# Patient Record
Sex: MALE | Race: WHITE | NOT HISPANIC OR LATINO | Employment: OTHER | URBAN - METROPOLITAN AREA
[De-identification: names, ages, dates, MRNs, and addresses within clinical notes are randomized per-mention and may not be internally consistent; named-entity substitution may affect disease eponyms.]

---

## 2025-02-04 ENCOUNTER — HOSPITAL ENCOUNTER (OUTPATIENT)
Dept: RADIOLOGY | Facility: HOSPITAL | Age: 85
Discharge: HOME | End: 2025-02-04
Payer: MEDICARE

## 2025-02-04 DIAGNOSIS — R10.13 EPIGASTRIC PAIN: ICD-10-CM

## 2025-02-04 PROCEDURE — 76705 ECHO EXAM OF ABDOMEN: CPT

## 2025-02-04 PROCEDURE — 76705 ECHO EXAM OF ABDOMEN: CPT | Performed by: RADIOLOGY

## 2025-02-06 ENCOUNTER — TELEPHONE (OUTPATIENT)
Dept: OPHTHALMOLOGY | Facility: CLINIC | Age: 85
End: 2025-02-06
Payer: MEDICARE

## 2025-02-06 ENCOUNTER — OFFICE VISIT (OUTPATIENT)
Dept: OPHTHALMOLOGY | Facility: CLINIC | Age: 85
End: 2025-02-06
Payer: MEDICARE

## 2025-02-06 DIAGNOSIS — H02.889 MGD (MEIBOMIAN GLAND DYSFUNCTION): ICD-10-CM

## 2025-02-06 DIAGNOSIS — H01.02A SQUAMOUS BLEPHARITIS OF BOTH UPPER AND LOWER EYELID OF RIGHT EYE: Primary | ICD-10-CM

## 2025-02-06 PROBLEM — H25.13 AGE-RELATED NUCLEAR CATARACT OF BOTH EYES: Status: ACTIVE | Noted: 2025-02-06

## 2025-02-06 PROCEDURE — 99213 OFFICE O/P EST LOW 20 MIN: CPT | Performed by: OPHTHALMOLOGY

## 2025-02-06 RX ORDER — NEOMYCIN SULFATE, POLYMYXIN B SULFATE AND DEXAMETHASONE 3.5; 10000; 1 MG/ML; [USP'U]/ML; MG/ML
1 SUSPENSION/ DROPS OPHTHALMIC 3 TIMES DAILY
Qty: 5 ML | Refills: 0 | Status: SHIPPED | OUTPATIENT
Start: 2025-02-06

## 2025-02-06 ASSESSMENT — ENCOUNTER SYMPTOMS
HEMATOLOGIC/LYMPHATIC NEGATIVE: 0
NEUROLOGICAL NEGATIVE: 0
EYES NEGATIVE: 0
CONSTITUTIONAL NEGATIVE: 0
MUSCULOSKELETAL NEGATIVE: 0
ENDOCRINE NEGATIVE: 0
CARDIOVASCULAR NEGATIVE: 0
GASTROINTESTINAL NEGATIVE: 0
PSYCHIATRIC NEGATIVE: 0
ALLERGIC/IMMUNOLOGIC NEGATIVE: 0
RESPIRATORY NEGATIVE: 0

## 2025-02-06 ASSESSMENT — EXTERNAL EXAM - RIGHT EYE: OD_EXAM: NORMAL

## 2025-02-06 ASSESSMENT — VISUAL ACUITY
OD_PH_SC: 20/20
OD_SC+: -1
OD_SC: 20/40
OS_SC+: -2
OS_SC: 20/25
METHOD: SNELLEN - LINEAR

## 2025-02-06 ASSESSMENT — PATIENT HEALTH QUESTIONNAIRE - PHQ9
SUM OF ALL RESPONSES TO PHQ9 QUESTIONS 1 AND 2: 0
2. FEELING DOWN, DEPRESSED OR HOPELESS: NOT AT ALL
1. LITTLE INTEREST OR PLEASURE IN DOING THINGS: NOT AT ALL

## 2025-02-06 ASSESSMENT — SLIT LAMP EXAM - LIDS: COMMENTS: MEIBOMIAN GLAND DYSFUNCTION

## 2025-02-06 ASSESSMENT — PAIN SCALES - GENERAL: PAINLEVEL_OUTOF10: 0-NO PAIN

## 2025-02-06 ASSESSMENT — EXTERNAL EXAM - LEFT EYE: OS_EXAM: NORMAL

## 2025-02-06 NOTE — PATIENT INSTRUCTIONS
Thank you so much for choosing me to provide your care today!    If you were dilated your vision may remain blurry   or light sensitive for several hours.    The nature of eye and vision problems can require frequent follow up, please make every effort to adhere to any future appointments.    If you have any issues, questions, or concerns,   please do not hesitate to reach out.    If you receive a survey in regards to your care today, please mention any exceptional care my office staff and/or technicians provided.    You can reach our office at this number:    [phone]    Please consider signing up for and utilizing [employer]!  This is the best way to directly reach me or other  providers

## 2025-02-06 NOTE — ASSESSMENT & PLAN NOTE
Likely root cause of blepharitis presentation. Will work on lid hygiene and control once acute blepharitis better controlled.

## 2025-02-06 NOTE — ASSESSMENT & PLAN NOTE
Blepharitis vs. Early hordeola. Will have start course of maxitrol but wait on warm compress to avoid increasing redness and swelling in short term. Can taper slightly on drops if improving. See back in few weeks for follow up.

## 2025-02-06 NOTE — TELEPHONE ENCOUNTER
Pt was last seen in 2021     Pts right eye swollen, something in eye.  Issue going on for 2 days  [phone]

## 2025-02-06 NOTE — PROGRESS NOTES
Assessment/Plan   Problem List Items Addressed This Visit       Squamous blepharitis of both upper and lower eyelid of right eye - Primary     Blepharitis vs. Early hordeola. Will have start course of maxitrol but wait on warm compress to avoid increasing redness and swelling in short term. Can taper slightly on drops if improving. See back in few weeks for follow up.          Relevant Medications    neomycin-polymyxin-dexAMETHasone (Maxitrol) 3.5mg/mL-10,000 unit/mL-0.1 % ophthalmic suspension    MGD (meibomian gland dysfunction)     Likely root cause of blepharitis presentation. Will work on lid hygiene and control once acute blepharitis better controlled.             Provided reassurance regarding above diagnoses and care received in the office visit today. Discussed outcomes and options along with the importance of treatment compliance. Understands the importance of any follow up visits. Patient instructed to call/communicate with our office if any new issues, questions, or concerns.     Will plan to see back in 1-2 weeks short check or sooner PRN

## 2025-02-21 ENCOUNTER — TELEPHONE (OUTPATIENT)
Dept: OPHTHALMOLOGY | Facility: CLINIC | Age: 85
End: 2025-02-21
Payer: MEDICARE

## 2025-02-21 DIAGNOSIS — H01.02A SQUAMOUS BLEPHARITIS OF BOTH UPPER AND LOWER EYELID OF RIGHT EYE: ICD-10-CM

## 2025-02-21 RX ORDER — NEOMYCIN SULFATE, POLYMYXIN B SULFATE AND DEXAMETHASONE 3.5; 10000; 1 MG/ML; [USP'U]/ML; MG/ML
1 SUSPENSION/ DROPS OPHTHALMIC 3 TIMES DAILY
Qty: 5 ML | Refills: 0 | Status: SHIPPED | OUTPATIENT
Start: 2025-02-21 | End: 2025-02-24

## 2025-02-21 NOTE — TELEPHONE ENCOUNTER
Pt is completely out of eye drops.  Has appointment on Monday.    Neomycin-polymyxin dexamethasone   Drug [facility]     Pt phone number [phone]

## 2025-02-24 ENCOUNTER — APPOINTMENT (OUTPATIENT)
Dept: OPHTHALMOLOGY | Facility: CLINIC | Age: 85
End: 2025-02-24
Payer: MEDICARE

## 2025-02-24 DIAGNOSIS — H02.889 MGD (MEIBOMIAN GLAND DYSFUNCTION): ICD-10-CM

## 2025-02-24 DIAGNOSIS — H00.11 CHALAZION OF RIGHT UPPER EYELID: ICD-10-CM

## 2025-02-24 DIAGNOSIS — H01.02A SQUAMOUS BLEPHARITIS OF BOTH UPPER AND LOWER EYELID OF RIGHT EYE: Primary | ICD-10-CM

## 2025-02-24 PROCEDURE — 99212 OFFICE O/P EST SF 10 MIN: CPT | Performed by: OPHTHALMOLOGY

## 2025-02-24 RX ORDER — OMEPRAZOLE 20 MG/1
1 CAPSULE, DELAYED RELEASE ORAL DAILY
COMMUNITY
Start: 2025-02-03 | End: 2025-03-05

## 2025-02-24 RX ORDER — TADALAFIL 20 MG/1
20 TABLET ORAL DAILY PRN
COMMUNITY

## 2025-02-24 RX ORDER — NEOMYCIN SULFATE, POLYMYXIN B SULFATE AND DEXAMETHASONE 3.5; 10000; 1 MG/ML; [USP'U]/ML; MG/ML
SUSPENSION/ DROPS OPHTHALMIC
Start: 2025-02-24

## 2025-02-24 RX ORDER — FINASTERIDE 1 MG/1
1 TABLET, FILM COATED ORAL DAILY
COMMUNITY
Start: 2025-02-03 | End: 2025-03-05

## 2025-02-24 RX ORDER — DICLOFENAC SODIUM 75 MG/1
75 TABLET, DELAYED RELEASE ORAL
COMMUNITY
Start: 2025-02-10

## 2025-02-24 RX ORDER — MELATONIN 3 MG
TABLET ORAL EVERY 24 HOURS
COMMUNITY

## 2025-02-24 RX ORDER — TAMSULOSIN HYDROCHLORIDE 0.4 MG/1
0.4 CAPSULE ORAL 2 TIMES DAILY
COMMUNITY
Start: 2024-05-15

## 2025-02-24 ASSESSMENT — PAIN SCALES - GENERAL: PAINLEVEL_OUTOF10: 0-NO PAIN

## 2025-02-24 ASSESSMENT — VISUAL ACUITY
OD_SC: 20/25
OD_SC+: +2
OS_SC: 20/20
METHOD: SNELLEN - SINGLE

## 2025-02-24 ASSESSMENT — PATIENT HEALTH QUESTIONNAIRE - PHQ9
2. FEELING DOWN, DEPRESSED OR HOPELESS: NOT AT ALL
1. LITTLE INTEREST OR PLEASURE IN DOING THINGS: NOT AT ALL
SUM OF ALL RESPONSES TO PHQ9 QUESTIONS 1 AND 2: 0

## 2025-02-24 ASSESSMENT — ENCOUNTER SYMPTOMS
RESPIRATORY NEGATIVE: 0
CONSTITUTIONAL NEGATIVE: 0
GASTROINTESTINAL NEGATIVE: 0
HEMATOLOGIC/LYMPHATIC NEGATIVE: 0
ENDOCRINE NEGATIVE: 0
EYES NEGATIVE: 0
CARDIOVASCULAR NEGATIVE: 0
ALLERGIC/IMMUNOLOGIC NEGATIVE: 0
MUSCULOSKELETAL NEGATIVE: 0
PSYCHIATRIC NEGATIVE: 0
NEUROLOGICAL NEGATIVE: 0

## 2025-02-24 ASSESSMENT — EXTERNAL EXAM - RIGHT EYE: OD_EXAM: NORMAL

## 2025-02-24 ASSESSMENT — TONOMETRY
IOP_METHOD: GOLDMANN APPLANATION
OD_IOP_MMHG: 15

## 2025-02-24 ASSESSMENT — SLIT LAMP EXAM - LIDS: COMMENTS: MEIBOMIAN GLAND DYSFUNCTION

## 2025-02-24 ASSESSMENT — EXTERNAL EXAM - LEFT EYE: OS_EXAM: NORMAL

## 2025-02-24 NOTE — PATIENT INSTRUCTIONS
Thank you so much for choosing me to provide your care today!    If you were dilated your vision may remain blurry   or light sensitive for several hours.    The nature of eye and vision problems can require frequent follow up, please make every effort to adhere to any future appointments.    If you have any issues, questions, or concerns,   please do not hesitate to reach out.    If you receive a survey in regards to your care today, please mention any exceptional care my office staff and/or technicians provided.    You can reach our office at this number:    [phone]    Please consider signing up for and utilizing [employer]!  This is the best way to directly reach me or other  providers    Warm Compress for Eyes    [x]Warm compress 1 time(s) daily to both eye(s)  10-15 minutes on closed eyes  To make a reusable compress:  Place 1 cup dry uncooked rice in a clean sock  Tie a knot  Microwave for 10-20 seconds, may add more time as needed  Compress should be warm but not hot   Replace if soiled or develops bad odor  May follow compress with gentle eyelid massage

## 2025-02-24 NOTE — ASSESSMENT & PLAN NOTE
Slight improvement in clinical exam, with nodule in lid seems likely chalazion contributed more to presentation at onset. Discussed continued use of maxitrol along with adding in warm compress.

## 2025-02-24 NOTE — PROGRESS NOTES
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 Discussed continued use of maxitrol along with adding in warm compress.          Relevant Medications    neomycin-polymyxin-dexAMETHasone (Maxitrol) 3.5mg/mL-10,000 unit/mL-0.1 % ophthalmic suspension    MGD (meibomian gland dysfunction)     Advised on role of regular warm compress both eyes (OU) to help with lid gland dysfunction.          Chalazion of right upper eyelid     Seems better explanation at this time for current symptoms. Cannot rule out role of blepharitis. Advised to continue maxitrol and on warm compress. See back in few weeks.             Provided reassurance regarding above diagnoses and care received in the office visit today. Discussed outcomes and options along with the importance of treatment compliance. Understands the importance of any follow up visits. Patient instructed to call/communicate with our office if any new issues, questions, or concerns.     Will plan to see back in few weeks for short check or sooner PRN

## 2025-02-24 NOTE — ASSESSMENT & PLAN NOTE
Seems better explanation at this time for current symptoms. Cannot rule out role of blepharitis. Advised to continue maxitrol and on warm compress. See back in few weeks.

## 2025-03-03 ENCOUNTER — TELEPHONE (OUTPATIENT)
Dept: OPHTHALMOLOGY | Facility: CLINIC | Age: 85
End: 2025-03-03
Payer: MEDICARE

## 2025-03-03 NOTE — TELEPHONE ENCOUNTER
Pt is asking that you call him when available, he is aware that you are not in office today.   [phone].

## 2025-03-03 NOTE — TELEPHONE ENCOUNTER
Pt called to say that his problem is now in both eyes, both swollen. He wants to know if he should still continue his eye drops twice daily?

## 2025-03-04 DIAGNOSIS — H10.213 ACUTE CHEMICAL CONJUNCTIVITIS OF BOTH EYES: Primary | ICD-10-CM

## 2025-03-04 RX ORDER — PREDNISOLONE ACETATE 10 MG/ML
1 SUSPENSION/ DROPS OPHTHALMIC 4 TIMES DAILY
Qty: 5 ML | Refills: 0 | Status: SHIPPED | OUTPATIENT
Start: 2025-03-04 | End: 2025-03-13 | Stop reason: SDUPTHER

## 2025-03-13 DIAGNOSIS — H10.213 ACUTE CHEMICAL CONJUNCTIVITIS OF BOTH EYES: ICD-10-CM

## 2025-03-13 RX ORDER — PREDNISOLONE ACETATE 10 MG/ML
1 SUSPENSION/ DROPS OPHTHALMIC 4 TIMES DAILY
Qty: 5 ML | Refills: 0 | Status: SHIPPED | OUTPATIENT
Start: 2025-03-13

## 2025-03-14 ENCOUNTER — TELEPHONE (OUTPATIENT)
Dept: OPHTHALMOLOGY | Facility: CLINIC | Age: 85
End: 2025-03-14
Payer: MEDICARE

## 2025-03-17 ENCOUNTER — APPOINTMENT (OUTPATIENT)
Dept: OPHTHALMOLOGY | Facility: CLINIC | Age: 85
End: 2025-03-17
Payer: MEDICARE

## 2025-03-19 ENCOUNTER — OFFICE VISIT (OUTPATIENT)
Dept: OPHTHALMOLOGY | Facility: CLINIC | Age: 85
End: 2025-03-19
Payer: MEDICARE

## 2025-03-19 DIAGNOSIS — H00.11 CHALAZION OF RIGHT UPPER EYELID: ICD-10-CM

## 2025-03-19 DIAGNOSIS — H02.889 MGD (MEIBOMIAN GLAND DYSFUNCTION): Primary | ICD-10-CM

## 2025-03-19 PROCEDURE — 99213 OFFICE O/P EST LOW 20 MIN: CPT | Performed by: OPHTHALMOLOGY

## 2025-03-19 RX ORDER — DOXYCYCLINE HYCLATE 100 MG
100 TABLET ORAL 2 TIMES DAILY
Qty: 60 TABLET | Refills: 1 | Status: SHIPPED | OUTPATIENT
Start: 2025-03-19 | End: 2025-05-18

## 2025-03-19 RX ORDER — PREDNISOLONE ACETATE 10 MG/ML
1 SUSPENSION/ DROPS OPHTHALMIC 2 TIMES DAILY
Start: 2025-03-19

## 2025-03-19 ASSESSMENT — ENCOUNTER SYMPTOMS
RESPIRATORY NEGATIVE: 0
ALLERGIC/IMMUNOLOGIC NEGATIVE: 0
HEMATOLOGIC/LYMPHATIC NEGATIVE: 0
NEUROLOGICAL NEGATIVE: 0
EYES NEGATIVE: 0
ENDOCRINE NEGATIVE: 0
CONSTITUTIONAL NEGATIVE: 0
GASTROINTESTINAL NEGATIVE: 0
CARDIOVASCULAR NEGATIVE: 0
PSYCHIATRIC NEGATIVE: 0
MUSCULOSKELETAL NEGATIVE: 0

## 2025-03-19 ASSESSMENT — VISUAL ACUITY
METHOD: SNELLEN - SINGLE
OD_SC: 20/40
OS_SC+: +2
OS_SC: 20/40

## 2025-03-19 ASSESSMENT — PAIN SCALES - GENERAL: PAINLEVEL_OUTOF10: 0-NO PAIN

## 2025-03-19 ASSESSMENT — EXTERNAL EXAM - LEFT EYE: OS_EXAM: NORMAL

## 2025-03-19 ASSESSMENT — TONOMETRY
OD_IOP_MMHG: 16
IOP_METHOD: GOLDMANN APPLANATION
OS_IOP_MMHG: 16

## 2025-03-19 ASSESSMENT — EXTERNAL EXAM - RIGHT EYE: OD_EXAM: NORMAL

## 2025-03-19 NOTE — PROGRESS NOTES
Assessment/Plan   Problem List Items Addressed This Visit       MGD (meibomian gland dysfunction) - Primary     Improvement in prior allergic reaction, suspect secondary to maxitrol. Will have continue prednisolone until seen back. Significant gland blockage and chalazia both eyes (OU) today. Will start course of doxycycline. Continue good heat in interim.          Relevant Medications    doxycycline (Vibra-Tabs) 100 mg tablet    prednisoLONE acetate (Pred-Forte) 1 % ophthalmic suspension    Chalazion of right upper eyelid     Numerous chalazia both eyes (OU), as per meibomian gland dysfunction (MGD) assessment         Relevant Medications    doxycycline (Vibra-Tabs) 100 mg tablet    prednisoLONE acetate (Pred-Forte) 1 % ophthalmic suspension       Provided reassurance regarding above diagnoses and care received in the office visit today. Discussed outcomes and options along with the importance of treatment compliance. Understands the importance of any follow up visits. Patient instructed to call/communicate with our office if any new issues, questions, or concerns.     Will plan to see back in few weeks short check or sooner PRN

## 2025-03-19 NOTE — ASSESSMENT & PLAN NOTE
Improvement in prior allergic reaction, suspect secondary to maxitrol. Will have continue prednisolone until seen back. Significant gland blockage and chalazia both eyes (OU) today. Will start course of doxycycline. Continue good heat in interim.

## 2025-03-19 NOTE — PATIENT INSTRUCTIONS
Thank you so much for choosing me to provide your care today!    If you were dilated your vision may remain blurry   or light sensitive for several hours.    The nature of eye and vision problems can require frequent follow up, please make every effort to adhere to any future appointments.    If you have any issues, questions, or concerns,   please do not hesitate to reach out.    If you receive a survey in regards to your care today, please mention any exceptional care my office staff and/or technicians provided.    You can reach our office at this number:    [phone]    Please consider signing up for and utilizing [employer]!  This is the best way to directly reach me or other  providers

## 2025-04-09 ENCOUNTER — APPOINTMENT (OUTPATIENT)
Dept: OPHTHALMOLOGY | Facility: CLINIC | Age: 85
End: 2025-04-09
Payer: MEDICARE

## 2025-04-09 DIAGNOSIS — H02.889 MGD (MEIBOMIAN GLAND DYSFUNCTION): ICD-10-CM

## 2025-04-09 DIAGNOSIS — H00.11 CHALAZION OF RIGHT UPPER EYELID: ICD-10-CM

## 2025-04-09 PROCEDURE — 99213 OFFICE O/P EST LOW 20 MIN: CPT | Performed by: OPHTHALMOLOGY

## 2025-04-09 RX ORDER — PREDNISOLONE ACETATE 10 MG/ML
SUSPENSION/ DROPS OPHTHALMIC
Qty: 5 ML | Refills: 0 | Status: SHIPPED | OUTPATIENT
Start: 2025-04-09

## 2025-04-09 ASSESSMENT — ENCOUNTER SYMPTOMS
CONSTITUTIONAL NEGATIVE: 0
MUSCULOSKELETAL NEGATIVE: 0
ALLERGIC/IMMUNOLOGIC NEGATIVE: 0
CARDIOVASCULAR NEGATIVE: 0
PSYCHIATRIC NEGATIVE: 0
HEMATOLOGIC/LYMPHATIC NEGATIVE: 0
ENDOCRINE NEGATIVE: 0
NEUROLOGICAL NEGATIVE: 0
EYES NEGATIVE: 0
GASTROINTESTINAL NEGATIVE: 0
RESPIRATORY NEGATIVE: 0

## 2025-04-09 ASSESSMENT — PAIN SCALES - GENERAL: PAINLEVEL_OUTOF10: 0-NO PAIN

## 2025-04-09 ASSESSMENT — VISUAL ACUITY
OD_SC: 20/30
OS_PH_SC: 20/30
OD_SC+: -2
OS_SC: 20/40
OS_SC+: -1
METHOD: SNELLEN - LINEAR

## 2025-04-09 ASSESSMENT — EXTERNAL EXAM - RIGHT EYE: OD_EXAM: NORMAL

## 2025-04-09 ASSESSMENT — EXTERNAL EXAM - LEFT EYE: OS_EXAM: NORMAL

## 2025-04-09 NOTE — PROGRESS NOTES
Assessment/Plan   Problem List Items Addressed This Visit       MGD (meibomian gland dysfunction)     Improvement with prednisolone but still with significant chalazia both eyes (OU). Will have continue doxy BID and plan to taper steroid. Advised to resume heat to see if can get residual chalazia to drain.          Relevant Medications    prednisoLONE acetate (Pred-Forte) 1 % ophthalmic suspension    Chalazion of right upper eyelid     Bilateral upper and lower. Advised on role of heat. Will taper steroid over next few weeks.          Relevant Medications    prednisoLONE acetate (Pred-Forte) 1 % ophthalmic suspension       Provided reassurance regarding above diagnoses and care received in the office visit today. Discussed outcomes and options along with the importance of treatment compliance. Understands the importance of any follow up visits. Patient instructed to call/communicate with our office if any new issues, questions, or concerns.     Will plan to see back in 1 month surface and lid check or sooner PRN

## 2025-04-09 NOTE — PATIENT INSTRUCTIONS
Thank you so much for choosing me to provide your care today!    If you were dilated your vision may remain blurry   or light sensitive for several hours.    The nature of eye and vision problems can require frequent follow up, please make every effort to adhere to any future appointments.    If you have any issues, questions, or concerns,   please do not hesitate to reach out.    If you receive a survey in regards to your care today, please mention any exceptional care my office staff and/or technicians provided.    You can reach our office at this number:    [phone]    Please consider signing up for and utilizing [employer]!  This is the best way to directly reach me or other  providers

## 2025-04-09 NOTE — ASSESSMENT & PLAN NOTE
Improvement with prednisolone but still with significant chalazia both eyes (OU). Will have continue doxy BID and plan to taper steroid. Advised to resume heat to see if can get residual chalazia to drain.

## 2025-05-15 ENCOUNTER — APPOINTMENT (OUTPATIENT)
Dept: OPHTHALMOLOGY | Facility: CLINIC | Age: 85
End: 2025-05-15
Payer: MEDICARE

## 2025-05-15 DIAGNOSIS — H02.88A MEIBOMIAN GLAND DYSFUNCTION (MGD) OF UPPER AND LOWER LIDS OF BOTH EYES: Primary | ICD-10-CM

## 2025-05-15 DIAGNOSIS — H02.88B MEIBOMIAN GLAND DYSFUNCTION (MGD) OF UPPER AND LOWER LIDS OF BOTH EYES: Primary | ICD-10-CM

## 2025-05-15 PROCEDURE — 99213 OFFICE O/P EST LOW 20 MIN: CPT | Performed by: OPHTHALMOLOGY

## 2025-05-15 ASSESSMENT — PATIENT HEALTH QUESTIONNAIRE - PHQ9
2. FEELING DOWN, DEPRESSED OR HOPELESS: NOT AT ALL
SUM OF ALL RESPONSES TO PHQ9 QUESTIONS 1 AND 2: 0
1. LITTLE INTEREST OR PLEASURE IN DOING THINGS: NOT AT ALL

## 2025-05-15 ASSESSMENT — ENCOUNTER SYMPTOMS
ENDOCRINE NEGATIVE: 0
HEMATOLOGIC/LYMPHATIC NEGATIVE: 0
CARDIOVASCULAR NEGATIVE: 0
ALLERGIC/IMMUNOLOGIC NEGATIVE: 0
RESPIRATORY NEGATIVE: 0
EYES NEGATIVE: 0
GASTROINTESTINAL NEGATIVE: 0
MUSCULOSKELETAL NEGATIVE: 0
NEUROLOGICAL NEGATIVE: 0
CONSTITUTIONAL NEGATIVE: 0
PSYCHIATRIC NEGATIVE: 0

## 2025-05-15 ASSESSMENT — VISUAL ACUITY
OS_SC+: +1
OS_SC: 20/30
OD_SC: 20/20
OD_SC+: -1
METHOD: SNELLEN - LINEAR

## 2025-05-15 ASSESSMENT — EXTERNAL EXAM - LEFT EYE: OS_EXAM: NORMAL

## 2025-05-15 ASSESSMENT — EXTERNAL EXAM - RIGHT EYE: OD_EXAM: NORMAL

## 2025-05-15 ASSESSMENT — PAIN SCALES - GENERAL: PAINLEVEL_OUTOF10: 0-NO PAIN

## 2025-05-15 NOTE — PATIENT INSTRUCTIONS
Thank you so much for choosing me to provide your care today!    If you were dilated your vision may remain blurry   or light sensitive for several hours.    The nature of eye and vision problems can require frequent follow up, please make every effort to adhere to any future appointments.    If you have any issues, questions, or concerns,   please do not hesitate to reach out.    If you receive a survey in regards to your care today, please mention any exceptional care my office staff and/or technicians provided.    You can reach our office at this number:    [phone]    Please consider signing up for and utilizing [employer]!  This is the best way to directly reach me or other  providers

## 2025-05-15 NOTE — ASSESSMENT & PLAN NOTE
Allergic component of inflammation appears resolved. Still with significantly inspissated glands and chalazia both eyes (OU). He would like to restart regular warm compress as had not been using regularly. Will continue with lubrication as well. Discussed that may need to resume doxy again if not improving.

## 2025-05-15 NOTE — PROGRESS NOTES
Assessment/Plan   Problem List Items Addressed This Visit       MGD (meibomian gland dysfunction) - Primary    Allergic component of inflammation appears resolved. Still with significantly inspissated glands and chalazia both eyes (OU). He would like to restart regular warm compress as had not been using regularly. Will continue with lubrication as well. Discussed that may need to resume doxy again if not improving.             Provided reassurance regarding above diagnoses and care received in the office visit today. Discussed outcomes and options along with the importance of treatment compliance. Understands the importance of any follow up visits. Patient instructed to call/communicate with our office if any new issues, questions, or concerns.     Will plan to see back in 1 month surface and lid check or sooner PRN

## 2025-06-12 ENCOUNTER — APPOINTMENT (OUTPATIENT)
Dept: OPHTHALMOLOGY | Facility: CLINIC | Age: 85
End: 2025-06-12
Payer: MEDICARE

## 2025-06-12 DIAGNOSIS — H00.11 CHALAZION OF RIGHT UPPER EYELID: ICD-10-CM

## 2025-06-12 DIAGNOSIS — H02.88A MEIBOMIAN GLAND DYSFUNCTION (MGD) OF UPPER AND LOWER LIDS OF BOTH EYES: Primary | ICD-10-CM

## 2025-06-12 DIAGNOSIS — H02.88B MEIBOMIAN GLAND DYSFUNCTION (MGD) OF UPPER AND LOWER LIDS OF BOTH EYES: Primary | ICD-10-CM

## 2025-06-12 DIAGNOSIS — H00.12 CHALAZION OF RIGHT LOWER EYELID: ICD-10-CM

## 2025-06-12 DIAGNOSIS — H00.14 CHALAZION OF LEFT UPPER EYELID: ICD-10-CM

## 2025-06-12 DIAGNOSIS — H00.15 CHALAZION OF LEFT LOWER EYELID: ICD-10-CM

## 2025-06-12 PROCEDURE — 99213 OFFICE O/P EST LOW 20 MIN: CPT | Performed by: OPHTHALMOLOGY

## 2025-06-12 RX ORDER — PREDNISOLONE ACETATE 10 MG/ML
1 SUSPENSION/ DROPS OPHTHALMIC DAILY
COMMUNITY

## 2025-06-12 ASSESSMENT — ENCOUNTER SYMPTOMS
NEUROLOGICAL NEGATIVE: 0
EYES NEGATIVE: 0
HEMATOLOGIC/LYMPHATIC NEGATIVE: 0
RESPIRATORY NEGATIVE: 0
ENDOCRINE NEGATIVE: 0
ALLERGIC/IMMUNOLOGIC NEGATIVE: 0
GASTROINTESTINAL NEGATIVE: 0
MUSCULOSKELETAL NEGATIVE: 0
CARDIOVASCULAR NEGATIVE: 0
PSYCHIATRIC NEGATIVE: 0
CONSTITUTIONAL NEGATIVE: 0

## 2025-06-12 ASSESSMENT — PATIENT HEALTH QUESTIONNAIRE - PHQ9
1. LITTLE INTEREST OR PLEASURE IN DOING THINGS: NOT AT ALL
SUM OF ALL RESPONSES TO PHQ9 QUESTIONS 1 AND 2: 0
2. FEELING DOWN, DEPRESSED OR HOPELESS: NOT AT ALL

## 2025-06-12 ASSESSMENT — VISUAL ACUITY
OD_PH_SC: 20/30
OS_SC: 20/30
METHOD: SNELLEN - LINEAR
OS_SC+: -1
OD_SC+: -2
OD_SC: 20/40

## 2025-06-12 ASSESSMENT — EXTERNAL EXAM - RIGHT EYE: OD_EXAM: NORMAL

## 2025-06-12 ASSESSMENT — PAIN SCALES - GENERAL: PAINLEVEL_OUTOF10: 0-NO PAIN

## 2025-06-12 ASSESSMENT — EXTERNAL EXAM - LEFT EYE: OS_EXAM: NORMAL

## 2025-06-12 NOTE — PROGRESS NOTES
Assessment/Plan   Problem List Items Addressed This Visit       MGD (meibomian gland dysfunction) - Primary    Allergic component of treatment for meibomian gland dysfunction (MGD) and chalazia remains resolved. Still with sub-optimal heat use, advised could trial a moist wet compress if naveed mask drying skin too much. OK to continue prednisolone in limited fashion if needed. Will refer to oculoplastics for options to excise numerous chalazia. Offered  services but prefers an outside option.          Chalazion of right upper eyelid    Chalazion of right lower eyelid    Chalazion of left upper eyelid    Chalazion of left lower eyelid       Provided reassurance regarding above diagnoses and care received in the office visit today. Discussed outcomes and options along with the importance of treatment compliance. Understands the importance of any follow up visits. Patient instructed to call/communicate with our office if any new issues, questions, or concerns.     Will plan to see back in after oculoplastics disposition or sooner PRN

## 2025-06-12 NOTE — ASSESSMENT & PLAN NOTE
Allergic component of treatment for meibomian gland dysfunction (MGD) and chalazia remains resolved. Still with sub-optimal heat use, advised could trial a moist wet compress if naveed mask drying skin too much. OK to continue prednisolone in limited fashion if needed. Will refer to oculoplastics for options to excise numerous chalazia. Offered  services but prefers an outside option.

## 2025-06-12 NOTE — PATIENT INSTRUCTIONS
Thank you so much for choosing me to provide your care today!    If you were dilated your vision may remain blurry   or light sensitive for several hours.    The nature of eye and vision problems can require frequent follow up, please make every effort to adhere to any future appointments.    If you have any issues, questions, or concerns,   please do not hesitate to reach out.    If you receive a survey in regards to your care today, please mention any exceptional care my office staff and/or technicians provided.    You can reach our office at this number:    [phone]    Please consider signing up for and utilizing [employer]!  This is the best way to directly reach me or other  providers

## 2025-06-25 ENCOUNTER — TELEPHONE (OUTPATIENT)
Dept: OPHTHALMOLOGY | Facility: CLINIC | Age: 85
End: 2025-06-25
Payer: MEDICARE

## 2025-06-25 NOTE — TELEPHONE ENCOUNTER
Returned call and left message. I am thinking he may be concerned about tobradex being the medication that caused a reaction previously. I explained that he was on maxitrol before and that tobradex should be OK to try. Left our number in case this was not the issue or if there are any other questions.

## 2025-07-03 ENCOUNTER — TELEPHONE (OUTPATIENT)
Dept: OPHTHALMOLOGY | Facility: CLINIC | Age: 85
End: 2025-07-03
Payer: MEDICARE

## 2025-07-03 NOTE — TELEPHONE ENCOUNTER
Spoke with patient, is not seeing Dr. Pat for any other treatment.  Made follow up appointment with Dr. Fragoso

## 2025-07-03 NOTE — TELEPHONE ENCOUNTER
Pt still taking tobramycin, issue is improving, but would like an appointment with you in [place] to have eye checked out

## 2025-07-24 ENCOUNTER — OFFICE VISIT (OUTPATIENT)
Dept: OPHTHALMOLOGY | Facility: CLINIC | Age: 85
End: 2025-07-24
Payer: MEDICARE

## 2025-07-24 DIAGNOSIS — H00.12 CHALAZION OF RIGHT LOWER EYELID: Primary | ICD-10-CM

## 2025-07-24 DIAGNOSIS — H02.88B MEIBOMIAN GLAND DYSFUNCTION (MGD) OF UPPER AND LOWER LIDS OF BOTH EYES: ICD-10-CM

## 2025-07-24 DIAGNOSIS — H02.88A MEIBOMIAN GLAND DYSFUNCTION (MGD) OF UPPER AND LOWER LIDS OF BOTH EYES: ICD-10-CM

## 2025-07-24 PROCEDURE — 99213 OFFICE O/P EST LOW 20 MIN: CPT | Performed by: OPHTHALMOLOGY

## 2025-07-24 ASSESSMENT — TONOMETRY
IOP_METHOD: GOLDMANN APPLANATION
OS_IOP_MMHG: 14
OD_IOP_MMHG: 14

## 2025-07-24 ASSESSMENT — ENCOUNTER SYMPTOMS
EYES NEGATIVE: 0
RESPIRATORY NEGATIVE: 0
ALLERGIC/IMMUNOLOGIC NEGATIVE: 0
CONSTITUTIONAL NEGATIVE: 0
PSYCHIATRIC NEGATIVE: 0
CARDIOVASCULAR NEGATIVE: 0
HEMATOLOGIC/LYMPHATIC NEGATIVE: 0
NEUROLOGICAL NEGATIVE: 0
MUSCULOSKELETAL NEGATIVE: 0
ENDOCRINE NEGATIVE: 0
GASTROINTESTINAL NEGATIVE: 0

## 2025-07-24 ASSESSMENT — VISUAL ACUITY
OD_SC+: +2
OS_SC: 20/30
OD_SC: 20/30
METHOD: SNELLEN - LINEAR

## 2025-07-24 ASSESSMENT — EXTERNAL EXAM - RIGHT EYE: OD_EXAM: NORMAL

## 2025-07-24 ASSESSMENT — EXTERNAL EXAM - LEFT EYE: OS_EXAM: NORMAL

## 2025-07-24 ASSESSMENT — PAIN SCALES - GENERAL: PAINLEVEL_OUTOF10: 0-NO PAIN

## 2025-07-24 NOTE — PROGRESS NOTES
Assessment/Plan   Problem List Items Addressed This Visit       MGD (meibomian gland dysfunction)    As per chalazion assessment.          Chalazion of right lower eyelid - Primary    Overall improved size of chalazia both eyes (OU), right lower eyelid (RLL) most prominent. Still significant glandular dysfunction overall. Advised to continue tobramycin as RX by outside OPS. He will reach out to get scheduled for planned excision both eyes (OU).             Provided reassurance regarding above diagnoses and care received in the office visit today. Discussed outcomes and options along with the importance of treatment compliance. Understands the importance of any follow up visits. Patient instructed to call/communicate with our office if any new issues, questions, or concerns.     Will plan to see back after OPS disposition or sooner PRN

## 2025-07-24 NOTE — ASSESSMENT & PLAN NOTE
Overall improved size of chalazia both eyes (OU), right lower eyelid (RLL) most prominent. Still significant glandular dysfunction overall. Advised to continue tobramycin as RX by outside OPS. He will reach out to get scheduled for planned excision both eyes (OU).

## 2025-07-24 NOTE — PATIENT INSTRUCTIONS
Thank you so much for choosing me to provide your care today!    If you were dilated your vision may remain blurry   or light sensitive for several hours.    The nature of eye and vision problems can require frequent follow up, please make every effort to adhere to any future appointments.    If you have any issues, questions, or concerns,   please do not hesitate to reach out.    If you receive a survey in regards to your care today, please mention any exceptional care my office staff and/or technicians provided.    You can reach our office at this number:    [phone]    Please consider signing up for and utilizing [email or web address]!  This is the best way to directly reach me or other  providers